# Patient Record
Sex: MALE | Race: ASIAN | Employment: FULL TIME | URBAN - METROPOLITAN AREA
[De-identification: names, ages, dates, MRNs, and addresses within clinical notes are randomized per-mention and may not be internally consistent; named-entity substitution may affect disease eponyms.]

---

## 2018-12-19 ENCOUNTER — TELEPHONE (OUTPATIENT)
Dept: OTHER | Facility: CLINIC | Age: 23
End: 2018-12-19

## 2018-12-19 NOTE — TELEPHONE ENCOUNTER
12/19/2018    Call Regarding Onboarding P1 Other    Attempt 1    Message on voicemail     Comments:       Outreach   CWR

## 2022-01-07 ENCOUNTER — OFFICE VISIT (OUTPATIENT)
Dept: FAMILY MEDICINE | Facility: CLINIC | Age: 27
End: 2022-01-07
Payer: COMMERCIAL

## 2022-01-07 VITALS
BODY MASS INDEX: 35.77 KG/M2 | WEIGHT: 236 LBS | HEIGHT: 68 IN | DIASTOLIC BLOOD PRESSURE: 78 MMHG | RESPIRATION RATE: 18 BRPM | HEART RATE: 86 BPM | TEMPERATURE: 98.2 F | SYSTOLIC BLOOD PRESSURE: 112 MMHG | OXYGEN SATURATION: 98 %

## 2022-01-07 DIAGNOSIS — R21 RASH AND NONSPECIFIC SKIN ERUPTION: ICD-10-CM

## 2022-01-07 DIAGNOSIS — Z11.59 NEED FOR HEPATITIS C SCREENING TEST: ICD-10-CM

## 2022-01-07 DIAGNOSIS — Z13.1 SCREENING FOR DIABETES MELLITUS: ICD-10-CM

## 2022-01-07 DIAGNOSIS — E66.09 CLASS 2 OBESITY DUE TO EXCESS CALORIES WITHOUT SERIOUS COMORBIDITY WITH BODY MASS INDEX (BMI) OF 35.0 TO 35.9 IN ADULT: ICD-10-CM

## 2022-01-07 DIAGNOSIS — Z11.4 SCREENING FOR HIV (HUMAN IMMUNODEFICIENCY VIRUS): ICD-10-CM

## 2022-01-07 DIAGNOSIS — Z83.49 FAMILY HISTORY OF THYROID DISEASE: ICD-10-CM

## 2022-01-07 DIAGNOSIS — E66.812 CLASS 2 OBESITY DUE TO EXCESS CALORIES WITHOUT SERIOUS COMORBIDITY WITH BODY MASS INDEX (BMI) OF 35.0 TO 35.9 IN ADULT: ICD-10-CM

## 2022-01-07 DIAGNOSIS — Z13.220 LIPID SCREENING: ICD-10-CM

## 2022-01-07 DIAGNOSIS — Z00.00 ROUTINE GENERAL MEDICAL EXAMINATION AT A HEALTH CARE FACILITY: Primary | ICD-10-CM

## 2022-01-07 LAB
BASOPHILS # BLD AUTO: 0 10E3/UL (ref 0–0.2)
BASOPHILS NFR BLD AUTO: 0 %
EOSINOPHIL # BLD AUTO: 0.2 10E3/UL (ref 0–0.7)
EOSINOPHIL NFR BLD AUTO: 2 %
ERYTHROCYTE [DISTWIDTH] IN BLOOD BY AUTOMATED COUNT: 12.3 % (ref 10–15)
HBA1C MFR BLD: 5.8 % (ref 0–5.6)
HCT VFR BLD AUTO: 45.6 % (ref 40–53)
HGB BLD-MCNC: 15.2 G/DL (ref 13.3–17.7)
LYMPHOCYTES # BLD AUTO: 2.6 10E3/UL (ref 0.8–5.3)
LYMPHOCYTES NFR BLD AUTO: 38 %
MCH RBC QN AUTO: 27.8 PG (ref 26.5–33)
MCHC RBC AUTO-ENTMCNC: 33.3 G/DL (ref 31.5–36.5)
MCV RBC AUTO: 84 FL (ref 78–100)
MONOCYTES # BLD AUTO: 0.6 10E3/UL (ref 0–1.3)
MONOCYTES NFR BLD AUTO: 9 %
NEUTROPHILS # BLD AUTO: 3.4 10E3/UL (ref 1.6–8.3)
NEUTROPHILS NFR BLD AUTO: 51 %
PLATELET # BLD AUTO: 350 10E3/UL (ref 150–450)
RBC # BLD AUTO: 5.46 10E6/UL (ref 4.4–5.9)
WBC # BLD AUTO: 6.8 10E3/UL (ref 4–11)

## 2022-01-07 PROCEDURE — 36415 COLL VENOUS BLD VENIPUNCTURE: CPT | Performed by: FAMILY MEDICINE

## 2022-01-07 PROCEDURE — 80061 LIPID PANEL: CPT | Performed by: FAMILY MEDICINE

## 2022-01-07 PROCEDURE — 99385 PREV VISIT NEW AGE 18-39: CPT | Performed by: FAMILY MEDICINE

## 2022-01-07 PROCEDURE — 86803 HEPATITIS C AB TEST: CPT | Performed by: FAMILY MEDICINE

## 2022-01-07 PROCEDURE — 83036 HEMOGLOBIN GLYCOSYLATED A1C: CPT | Performed by: FAMILY MEDICINE

## 2022-01-07 PROCEDURE — 80050 GENERAL HEALTH PANEL: CPT | Performed by: FAMILY MEDICINE

## 2022-01-07 PROCEDURE — 87389 HIV-1 AG W/HIV-1&-2 AB AG IA: CPT | Performed by: FAMILY MEDICINE

## 2022-01-07 SDOH — ECONOMIC STABILITY: TRANSPORTATION INSECURITY
IN THE PAST 12 MONTHS, HAS THE LACK OF TRANSPORTATION KEPT YOU FROM MEDICAL APPOINTMENTS OR FROM GETTING MEDICATIONS?: NO

## 2022-01-07 SDOH — ECONOMIC STABILITY: INCOME INSECURITY: HOW HARD IS IT FOR YOU TO PAY FOR THE VERY BASICS LIKE FOOD, HOUSING, MEDICAL CARE, AND HEATING?: NOT HARD AT ALL

## 2022-01-07 SDOH — ECONOMIC STABILITY: TRANSPORTATION INSECURITY
IN THE PAST 12 MONTHS, HAS LACK OF TRANSPORTATION KEPT YOU FROM MEETINGS, WORK, OR FROM GETTING THINGS NEEDED FOR DAILY LIVING?: NO

## 2022-01-07 SDOH — ECONOMIC STABILITY: FOOD INSECURITY: WITHIN THE PAST 12 MONTHS, THE FOOD YOU BOUGHT JUST DIDN'T LAST AND YOU DIDN'T HAVE MONEY TO GET MORE.: NEVER TRUE

## 2022-01-07 SDOH — ECONOMIC STABILITY: INCOME INSECURITY: IN THE LAST 12 MONTHS, WAS THERE A TIME WHEN YOU WERE NOT ABLE TO PAY THE MORTGAGE OR RENT ON TIME?: NO

## 2022-01-07 SDOH — ECONOMIC STABILITY: FOOD INSECURITY: WITHIN THE PAST 12 MONTHS, YOU WORRIED THAT YOUR FOOD WOULD RUN OUT BEFORE YOU GOT MONEY TO BUY MORE.: NEVER TRUE

## 2022-01-07 SDOH — HEALTH STABILITY: PHYSICAL HEALTH: ON AVERAGE, HOW MANY DAYS PER WEEK DO YOU ENGAGE IN MODERATE TO STRENUOUS EXERCISE (LIKE A BRISK WALK)?: 3 DAYS

## 2022-01-07 SDOH — HEALTH STABILITY: PHYSICAL HEALTH: ON AVERAGE, HOW MANY MINUTES DO YOU ENGAGE IN EXERCISE AT THIS LEVEL?: 30 MIN

## 2022-01-07 ASSESSMENT — ENCOUNTER SYMPTOMS
DYSURIA: 0
HEARTBURN: 0
ARTHRALGIAS: 0
COUGH: 0
CHILLS: 0
CONSTIPATION: 0
NAUSEA: 0
SHORTNESS OF BREATH: 0
MYALGIAS: 0
FREQUENCY: 0
EYE PAIN: 0
PALPITATIONS: 0
JOINT SWELLING: 0
DIZZINESS: 0
WEAKNESS: 0
DIARRHEA: 0
SORE THROAT: 0
NERVOUS/ANXIOUS: 0
HEMATOCHEZIA: 0
HEMATURIA: 0
PARESTHESIAS: 1
ABDOMINAL PAIN: 0
HEADACHES: 0
FEVER: 0

## 2022-01-07 ASSESSMENT — SOCIAL DETERMINANTS OF HEALTH (SDOH)
IN A TYPICAL WEEK, HOW MANY TIMES DO YOU TALK ON THE PHONE WITH FAMILY, FRIENDS, OR NEIGHBORS?: MORE THAN THREE TIMES A WEEK
DO YOU BELONG TO ANY CLUBS OR ORGANIZATIONS SUCH AS CHURCH GROUPS UNIONS, FRATERNAL OR ATHLETIC GROUPS, OR SCHOOL GROUPS?: NO
HOW OFTEN DO YOU GET TOGETHER WITH FRIENDS OR RELATIVES?: NEVER
HOW OFTEN DO YOU ATTEND CHURCH OR RELIGIOUS SERVICES?: NEVER

## 2022-01-07 ASSESSMENT — LIFESTYLE VARIABLES
HOW OFTEN DO YOU HAVE SIX OR MORE DRINKS ON ONE OCCASION: NEVER
HOW OFTEN DO YOU HAVE A DRINK CONTAINING ALCOHOL: NEVER
HOW MANY STANDARD DRINKS CONTAINING ALCOHOL DO YOU HAVE ON A TYPICAL DAY: PATIENT DECLINED

## 2022-01-07 ASSESSMENT — MIFFLIN-ST. JEOR: SCORE: 2024.99

## 2022-01-07 NOTE — PATIENT INSTRUCTIONS
Zyrtec 10 mg daily 2 weeks    Preventive Health Recommendations  Male Ages 26 - 39    Yearly exam:             See your health care provider every year in order to  o   Review health changes.   o   Discuss preventive care.    o   Review your medicines if your doctor has prescribed any.    You should be tested each year for STDs (sexually transmitted diseases), if you re at risk.     After age 35, talk to your provider about cholesterol testing. If you are at risk for heart disease, have your cholesterol tested at least every 5 years.     If you are at risk for diabetes, you should have a diabetes test (fasting glucose).  Shots: Get a flu shot each year. Get a tetanus shot every 10 years.     Nutrition:    Eat at least 5 servings of fruits and vegetables daily.     Eat whole-grain bread, whole-wheat pasta and brown rice instead of white grains and rice.     Get adequate Calcium and Vitamin D.     Lifestyle    Exercise for at least 150 minutes a week (30 minutes a day, 5 days a week). This will help you control your weight and prevent disease.     Limit alcohol to one drink per day.     No smoking.     Wear sunscreen to prevent skin cancer.     See your dentist every six months for an exam and cleaning.     Patient Education     Hives (Adult)  Hives are pink or red bumps on the skin. These bumps are also known as wheals. The bumps can itch, burn, or sting. Hives can occur anywhere on the body. They vary in size and shape and can form in clusters. Individual hives can appear and go away quickly. New hives may develop as old ones fade. Hives are common and usually harmless. They are not contagious. Occasionally, hives are a sign of a serious allergy.   Hives are often caused by an allergic reaction. They may occur from:     Certain foods, such as shellfish, nuts, tomatoes, or berries    Contact with something in the environment, such as pollens, animals, or mold    Certain medicines    Sun or cold air    Viral  infections, such as a cold, the flu, or strep throat  If the hives continue to come and go over many weeks without any other symptoms (chronic hives), the cause may be very hard to figure out.   You may be prescribed medicines to ease swelling and itching. Follow all instructions when using these medicines. The hives will usually fade in a few days. But they can last for weeks or months.   Home care   Follow these tips:    Try to find the cause of the hives and eliminate it. Discuss possible causes with your healthcare provider. Your healthcare provider may ask you to keep track of the food you eat and your lifestyle to help find the cause of the hives.    Don t scratch the hives. Scratching will delay healing. To reduce itching, apply cool, wet compresses to the skin.    Dress in soft, loose cotton clothing.    Don t bathe in hot water. This can make the itching worse.    Apply an ice pack or cool pack wrapped in a thin towel to your skin. This will help reduce redness and itching. But if your hives were caused by exposure to cold, then do not apply more cold to them.    You may use over-the counter antihistamines to reduce itching. Some older antihistamines, such as diphenhydramine and chlorpheniramine, are inexpensive. But they need to be taken often and may make you sleepy. They are best used at bedtime. Don t use diphenhydramine if you have glaucoma or have trouble urinating because of an enlarged prostate. Newer antihistamines, such as loratadine, cetirizine, levocetirizine, and fexofenadine, are generally more expensive. But they tend to have fewer side effects. They can be taken less often.    Another type of antihistamine is used to treat heartburn. This type includes nizatidine, famotidine, and cimetidine. These are sometimes used along with the above antihistamines if a single medicine is not working.    If the hives are severe and you do not respond well to other medicines, you may be given a steroid,  such as prednisone, to take for a short time. Follow all instructions carefully when taking this medicine. Tell your healthcare provider about any side effects.  Follow-up care   Follow up with your healthcare provider if your symptoms don't get better in 2 days. Ask your provider about allergy testing if you have had a severe reaction or have had several episodes of hives. Allergy testing may help figure out what you are allergic to. You may need blood tests, a urine test, or skin tests.   When to seek medical advice   Call your healthcare provider right away if any of these occur:     Fever of 100.4 F (38.0 C) or higher, or as directed by your healthcare provider    Redness, swelling, or pain    Foul-smelling fluid coming from the rash  Call 911  Call 911 if any of the following occur:     Swelling of the face, throat, or tongue    Trouble breathing or swallowing    Dizziness, weakness, or fainting  [employer] last reviewed this educational content on 6/1/2019 2000-2021 [employer]. All rights reserved. This information is not intended as a substitute for professional medical care. Always follow your healthcare professional's instructions.

## 2022-01-07 NOTE — PROGRESS NOTES
SUBJECTIVE:   CC: Tony Blue is an 26 year old male who presents for preventative health visit.     Patient has been advised of split billing requirements and indicates understanding: Yes  Healthy Habits:     Getting at least 3 servings of Calcium per day:  Yes    Bi-annual eye exam:  NO    Dental care twice a year:  NO    Sleep apnea or symptoms of sleep apnea:  Excessive snoring    Diet:  Regular (no restrictions)    Frequency of exercise:  2-3 days/week    Duration of exercise:  15-30 minutes    Taking medications regularly:  Not Applicable    Medication side effects:  None    PHQ-2 Total Score: 0    Additional concerns today:  No    Today's PHQ-2 Score:   PHQ-2 ( 1999 [employer]) 1/7/2022   Q1: Little interest or pleasure in doing things 0   Q2: Feeling down, depressed or hopeless 0   PHQ-2 Score 0   Q1: Little interest or pleasure in doing things Not at all   Q2: Feeling down, depressed or hopeless Not at all   PHQ-2 Score 0     Abuse: Current or Past(Physical, Sexual or Emotional)- No  Do you feel safe in your environment? Yes    Have you ever done Advance Care Planning? (For example, a Health Directive, POLST, or a discussion with a medical provider or your loved ones about your wishes): No, advance care planning information given to patient to review.  Patient declined advance care planning discussion at this time.    Social History     Tobacco Use     Smoking status: Never Smoker     Smokeless tobacco: Never Used   Substance Use Topics     Alcohol use: Never     Alcohol Use 1/7/2022   Prescreen: >3 drinks/day or >7 drinks/week? Not Applicable     Last PSA: No results found for: PSA    Reviewed orders with patient. Reviewed health maintenance and updated orders accordingly - Yes    Reviewed and updated as needed this visit by clinical staff  Tobacco  Allergies  Meds  Problems  Med Hx  Surg Hx  Fam Hx  Soc Hx         Reviewed and updated as needed this visit by Provider  Tobacco   Meds  Problems          "  History reviewed. No pertinent past medical history.   Past Surgical History:   Procedure Laterality Date     MASS EXCISION  2021    Right 5th finger mass removal     Review of Systems   Constitutional: Negative for chills and fever.   HENT: Negative for congestion, ear pain, hearing loss and sore throat.    Eyes: Negative for pain and visual disturbance.   Respiratory: Negative for cough and shortness of breath.    Cardiovascular: Negative for chest pain, palpitations and peripheral edema.   Gastrointestinal: Negative for abdominal pain, constipation, diarrhea, heartburn, hematochezia and nausea.   Genitourinary: Negative for dysuria, frequency, genital sores, hematuria, impotence, penile discharge and urgency.   Musculoskeletal: Negative for arthralgias, joint swelling and myalgias.   Skin: Positive for rash.   Neurological: Positive for paresthesias. Negative for dizziness, weakness and headaches.   Psychiatric/Behavioral: Negative for mood changes. The patient is not nervous/anxious.      OBJECTIVE:   /78   Pulse 86   Temp 98.2  F (36.8  C) (Oral)   Resp 18   Ht 1.727 m (5' 8\")   Wt 107 kg (236 lb)   SpO2 98%   BMI 35.88 kg/m      Physical Exam  GENERAL: healthy, alert and no distress  EYES: Eyes grossly normal to inspection, PERRL and conjunctivae and sclerae normal  HENT: ear canals and TM's normal, nose and mouth without ulcers or lesions  NECK: no adenopathy, no asymmetry, masses, or scars and thyroid normal to palpation  RESP: lungs clear to auscultation - no rales, rhonchi or wheezes  CV: regular rate and rhythm, normal S1 S2, no S3 or S4, no murmur, click or rub, no peripheral edema and peripheral pulses strong  ABDOMEN: soft, nontender, no hepatosplenomegaly, no masses and bowel sounds normal  MS: no gross musculoskeletal defects noted, no edema  SKIN: no suspicious lesions or rashes  NEURO: Normal strength and tone, mentation intact and speech normal  PSYCH: mentation appears normal, " "affect normal/bright    Diagnostic Test Results:  Labs reviewed in Epic    ASSESSMENT/PLAN:       ICD-10-CM    1. Routine general medical examination at a health care facility  Z00.00    2. Screening for HIV (human immunodeficiency virus)  Z11.4 HIV Antigen Antibody Combo   3. Class 2 obesity due to excess calories without serious comorbidity with body mass index (BMI) of 35.0 to 35.9 in adult  E66.09 Comprehensive metabolic panel (BMP + Alb, Alk Phos, ALT, AST, Total. Bili, TP)    Z68.35    4. Lipid screening  Z13.220 Lipid panel reflex to direct LDL Fasting   5. Need for hepatitis C screening test  Z11.59 Hepatitis C Screen Reflex to HCV RNA Quant and Genotype   6. Family history of thyroid disease  Z83.49 TSH with free T4 reflex   7. Screening for diabetes mellitus  Z13.1 Hemoglobin A1c   8. Rash and nonspecific skin eruption  R21 CBC with platelets and differential     Patient has been advised of split billing requirements and indicates understanding: No     COUNSELING:   Reviewed preventive health counseling, as reflected in patient instructions    Estimated body mass index is 35.88 kg/m  as calculated from the following:    Height as of this encounter: 1.727 m (5' 8\").    Weight as of this encounter: 107 kg (236 lb).     Weight management plan: Discussed healthy diet and exercise guidelines    He reports that he has never smoked. He has never used smokeless tobacco.    Counseling Resources:  ATP IV Guidelines  Pooled Cohorts Equation Calculator  FRAX Risk Assessment  ICSI Preventive Guidelines  Dietary Guidelines for Americans, 2010  USDA's MyPlate  ASA Prophylaxis  Lung CA Screening    Nestor Gomes MD  [facility]  "

## 2022-01-07 NOTE — LETTER
"January 13, 2022      Tony Su  [address] [facility] [address]        Dear ,    We are writing to inform you of your test results.    The results of your recent total cholesterol test were elevated.  Your total cholesterol should be less than 200.     The primary goal of therapy is the LDL or \"bad\" cholesterol.  Your LDL level was within normal limits.  Your LDL goal based on risk factors (i.e. medical history, smoking, family history, high blood pressure, low HDL cholesterol) and age is less than 130.     Your HDL, or \"good\" cholesterol is abnormal.  Your goal is an HDL level above 40.     Triglycerides are another cholesterol component that is associated with heart disease.  Normal triglycerides are less than 150.  Your triglyceride level is abnormal.     I would recommend: increase daily fiber intake, weight loss, increase physical activity with a goal of 150 minutes moderate exercise weekly, decrease saturated fat intake to less than 7% of total calories, and repeat fasting lipids and liver tests in 6 months.  Triglyceride levels above 400 can be an independent risk factor for vascular disease but can also increase her risk for pancreatitis.  If this level remains above 400 we would have you consider medication for triglycerides.  I would have you work on diet and activity changes and recheck with fasting cholesterol check in 6 months.  Follow-up in clinic in 6 months fasting for 12 hours.  If you are not able to make changes with recommendations below then we will have you consider medication.     Your hemoglobin A1C (measures average blood sugar level) is high and considered to be \"pre-diabetes.\"  This is related to having a poor diet with too many sugary foods and carbohydrates found in foods such as pasta, bread, and potatoes.  It can also reflect low levels of activity.  I would recommend increasing your activity to 30 minutes most days of the week and improving your diet.     Your complete " "metabolic panel indicates normal kidney function, normal electrolyte levels (sodium, potassium, and calcium levels are normal), and normal liver function (ALT, AST, bilirubin).     You have normal red blood cell (hemoglobin) levels, no anemia, normal white blood cell count and normal platelet levels.     Your TSH level, a screening test for thyroid disease, was normal.     Your HIV screening was normal.     Your hepatitis C screening was normal.     What lifestyle changes can I make to help improve my cholesterol levels?     Exercise regularly.  Exercise can raise HDL cholesterol levels and reduce levels of LDL cholesterol and triglycerides. If you haven't been exercising, try to work up to 30 minutes, 4 to 6 times a week.     Lose weight if you are overweight.  Being overweight can raise your cholesterol levels. Losing weight, even just 5 or 10 pounds, can lower your total cholesterol, LDL cholesterol and triglyceride levels.   If you smoke, quit.     Smoking lowers your HDL cholesterol. Even exposure to second-hand smoke can affect your HDL level.  If you need help let us know or contact [employer] for a personalized tobacco cessation plan: www.quitplan.org or 2-753-895-PLAN.     Eat a heart-healthy diet.  Eat plenty of fresh fruits and vegetables. Fruits and vegetables are naturally low in fat. Not only do they add flavor and variety to your diet, but they are also the best source of fiber, vitamins and minerals for your body. Aim for 5 cups of fruits and vegetables every day, not counting potatoes, corn and rice. Potatoes, corn and rice count as carbohydrates.     Pick \"good\" fats over \"bad\" fats. Fat is part of a healthy diet, but you need to know the difference between \"bad\" fats and \"good\" fats. \"Bad\" fats, such as saturated and trans fats, are found in foods such as butter; coconut and palm oil; saturated or partially hydrogenated vegetable fats such as shortening and margarine; animal fats in meats; and " "fats in whole milk dairy products. Limit the amount of saturated fat in your diet, and avoid trans fat completely. Unsaturated fat is the \"good\" fat. Most fats in fish, vegetables, grains and tree nuts are unsaturated. Try to eat unsaturated fat in place of saturated fat. For example, you can use olive oil or canola oil in cooking instead of butter.     Use healthier cooking methods. Baking, broiling and roasting are the healthiest ways to prepare meat, poultry and other foods. Trim any outside fat or skin before cooking. Lean cuts can be pan-broiled or stir-fried. Use either a nonstick pan or nonstick cooking spray instead of adding fats such as butter or margarine. When eating out, ask how food is prepared. You can request that your food be baked, broiled or roasted, rather than fried.     Look for other sources of protein. Fish, dry beans, tree nuts, peas and lentils offer protein, nutrients and fiber without the cholesterol and saturated fats that meats have. Consider eating one \"meatless\" meal each week. Try substituting beans for meat in a favorite recipe, such as lasagna or chili. Snack on a handful of almonds or pecans. Soy is also an excellent source of protein. Good examples of soy include soy milk, edamame (green soy beans), tofu and soy protein shakes.     Get more fiber in your diet. Add good sources of fiber to your meals. Examples include fruits, vegetables, whole grains (such as oat bran, whole and rolled oats and barley), legumes (such as beans and peas) and nuts and seeds (such as ground flax seed). In addition to fiber, whole grains supply B-vitamins and important nutrients not found in foods made with white flour.     Eat more fish. Fish are an excellent source of omega-3 fatty acids. Wild-caught oily fish, such as salmon, tuna, mackerel and sardines, are the best sources of omega-3s, but all fish contain some amount of this beneficial fatty acid. Aim for 2 6-oz servings each week.     Limit how " much cholesterol you get in your diet. You should limit your overall cholesterol intake to less than 300 milligrams per day, or less than 200 milligrams if you have heart disease.     It was a pleasure to see you at your recent visit.  Please call if you have any questions.       Resulted Orders   Lipid panel reflex to direct LDL Fasting   Result Value Ref Range    Cholesterol 231 (H) <200 mg/dL    Triglycerides 400 (H) <150 mg/dL    Direct Measure HDL 27 (L) >=40 mg/dL    LDL Cholesterol Calculated 124 (H) <=100 mg/dL    Non HDL Cholesterol 204 (H) <130 mg/dL    Patient Fasting > 8hrs? No     Narrative    Cholesterol  Desirable:  <200 mg/dL    Triglycerides  Normal:  Less than 150 mg/dL  Borderline High:  150-199 mg/dL  High:  200-499 mg/dL  Very High:  Greater than or equal to 500 mg/dL    Direct Measure HDL  Female:  Greater than or equal to 50 mg/dL   Male:  Greater than or equal to 40 mg/dL    LDL Cholesterol  Desirable:  <100mg/dL  Above Desirable:  100-129 mg/dL   Borderline High:  130-159 mg/dL   High:  160-189 mg/dL   Very High:  >= 190 mg/dL    Non HDL Cholesterol  Desirable:  130 mg/dL  Above Desirable:  130-159 mg/dL  Borderline High:  160-189 mg/dL  High:  190-219 mg/dL  Very High:  Greater than or equal to 220 mg/dL   Comprehensive metabolic panel (BMP + Alb, Alk Phos, ALT, AST, Total. Bili, TP)   Result Value Ref Range    Sodium 135 133 - 144 mmol/L    Potassium 4.3 3.4 - 5.3 mmol/L    Chloride 106 94 - 109 mmol/L    Carbon Dioxide (CO2) 25 20 - 32 mmol/L    Anion Gap 4 3 - 14 mmol/L    Urea Nitrogen 15 7 - 30 mg/dL    Creatinine 0.91 0.66 - 1.25 mg/dL    Calcium 8.6 8.5 - 10.1 mg/dL    Glucose 100 (H) 70 - 99 mg/dL    Alkaline Phosphatase 78 40 - 150 U/L    AST 26 0 - 45 U/L    ALT 68 0 - 70 U/L    Protein Total 7.7 6.8 - 8.8 g/dL    Albumin 4.2 3.4 - 5.0 g/dL    Bilirubin Total 0.4 0.2 - 1.3 mg/dL    GFR Estimate >90 >60 mL/min/1.73m2      Comment:      Effective December 21, 2021 eGFRcr in adults  is calculated using the 2021 CKD-EPI creatinine equation which includes age and gender (Raphael et al., NE, DOI: 10.1056/JJLXzh0336431)   HIV Antigen Antibody Combo   Result Value Ref Range    HIV Antigen Antibody Combo Nonreactive Nonreactive      Comment:      HIV-1 p24 Ag & HIV-1/HIV-2 Ab Not Detected   Hepatitis C Screen Reflex to HCV RNA Quant and Genotype   Result Value Ref Range    Hepatitis C Antibody Nonreactive Nonreactive    Narrative    Assay performance characteristics have not been established for newborns, infants, and children.   TSH with free T4 reflex   Result Value Ref Range    TSH 2.36 0.40 - 4.00 mU/L   Hemoglobin A1c   Result Value Ref Range    Hemoglobin A1C 5.8 (H) 0.0 - 5.6 %      Comment:      Normal <5.7%   Prediabetes 5.7-6.4%    Diabetes 6.5% or higher     Note: Adopted from ADA consensus guidelines.   CBC with platelets and differential   Result Value Ref Range    WBC Count 6.8 4.0 - 11.0 10e3/uL    RBC Count 5.46 4.40 - 5.90 10e6/uL    Hemoglobin 15.2 13.3 - 17.7 g/dL    Hematocrit 45.6 40.0 - 53.0 %    MCV 84 78 - 100 fL    MCH 27.8 26.5 - 33.0 pg    MCHC 33.3 31.5 - 36.5 g/dL    RDW 12.3 10.0 - 15.0 %    Platelet Count 350 150 - 450 10e3/uL    % Neutrophils 51 %    % Lymphocytes 38 %    % Monocytes 9 %    % Eosinophils 2 %    % Basophils 0 %    Absolute Neutrophils 3.4 1.6 - 8.3 10e3/uL    Absolute Lymphocytes 2.6 0.8 - 5.3 10e3/uL    Absolute Monocytes 0.6 0.0 - 1.3 10e3/uL    Absolute Eosinophils 0.2 0.0 - 0.7 10e3/uL    Absolute Basophils 0.0 0.0 - 0.2 10e3/uL       If you have any questions or concerns, please call the clinic at the number listed above.       Sincerely,      Nestor Gomes MD

## 2022-01-08 LAB
ALBUMIN SERPL-MCNC: 4.2 G/DL (ref 3.4–5)
ALP SERPL-CCNC: 78 U/L (ref 40–150)
ALT SERPL W P-5'-P-CCNC: 68 U/L (ref 0–70)
ANION GAP SERPL CALCULATED.3IONS-SCNC: 4 MMOL/L (ref 3–14)
AST SERPL W P-5'-P-CCNC: 26 U/L (ref 0–45)
BILIRUB SERPL-MCNC: 0.4 MG/DL (ref 0.2–1.3)
BUN SERPL-MCNC: 15 MG/DL (ref 7–30)
CALCIUM SERPL-MCNC: 8.6 MG/DL (ref 8.5–10.1)
CHLORIDE BLD-SCNC: 106 MMOL/L (ref 94–109)
CHOLEST SERPL-MCNC: 231 MG/DL
CO2 SERPL-SCNC: 25 MMOL/L (ref 20–32)
CREAT SERPL-MCNC: 0.91 MG/DL (ref 0.66–1.25)
FASTING STATUS PATIENT QL REPORTED: NO
GFR SERPL CREATININE-BSD FRML MDRD: >90 ML/MIN/1.73M2
GLUCOSE BLD-MCNC: 100 MG/DL (ref 70–99)
HDLC SERPL-MCNC: 27 MG/DL
LDLC SERPL CALC-MCNC: 124 MG/DL
NONHDLC SERPL-MCNC: 204 MG/DL
POTASSIUM BLD-SCNC: 4.3 MMOL/L (ref 3.4–5.3)
PROT SERPL-MCNC: 7.7 G/DL (ref 6.8–8.8)
SODIUM SERPL-SCNC: 135 MMOL/L (ref 133–144)
TRIGL SERPL-MCNC: 400 MG/DL
TSH SERPL DL<=0.005 MIU/L-ACNC: 2.36 MU/L (ref 0.4–4)

## 2022-01-10 LAB
HCV AB SERPL QL IA: NONREACTIVE
HIV 1+2 AB+HIV1 P24 AG SERPL QL IA: NONREACTIVE

## 2022-01-13 PROBLEM — R73.03 PREDIABETES: Status: ACTIVE | Noted: 2022-01-13

## 2022-01-13 PROBLEM — E78.1 HYPERTRIGLYCERIDEMIA: Status: ACTIVE | Noted: 2022-01-13

## 2022-07-18 ENCOUNTER — TELEPHONE (OUTPATIENT)
Dept: FAMILY MEDICINE | Facility: CLINIC | Age: 27
End: 2022-07-18

## 2022-07-18 NOTE — TELEPHONE ENCOUNTER
.Reason for Call: Request for an order or referral:    Order or referral being requested: Dermatology     Date needed: at your convenience    Has the patient been seen by the PCP for this problem? YES    Additional comments: Hives are still occurring even with OTC medication and creams. Heat makes it worse. Wife called asking for derm referral     Phone number Patient can be reached at:  Cell number on file:    Telephone Information:   Mobile [phone]       Best Time:  anytime    Can we leave a detailed message on this number?  YES    Call taken on 7/18/2022 at 9:32 AM by Ray Stanley

## 2022-07-18 NOTE — TELEPHONE ENCOUNTER
Pt requesting referral for Derm. Pt was seen by ANNEQ with OV on 1/7/22, states that medications have not helped with rash and would like to be seen by dermatology. Pt is a new pt to [facility] and has not est care with new provider. Called and spoke with pt, advised to schedule follow up to discuss and est care, verbalized understanding. Scheduled pt for 8/9/22.    Andrade STRONG RN

## 2022-08-09 ENCOUNTER — OFFICE VISIT (OUTPATIENT)
Dept: FAMILY MEDICINE | Facility: CLINIC | Age: 27
End: 2022-08-09
Payer: COMMERCIAL

## 2022-08-09 VITALS
DIASTOLIC BLOOD PRESSURE: 68 MMHG | SYSTOLIC BLOOD PRESSURE: 112 MMHG | RESPIRATION RATE: 16 BRPM | HEIGHT: 68 IN | OXYGEN SATURATION: 100 % | WEIGHT: 240 LBS | HEART RATE: 78 BPM | TEMPERATURE: 98.4 F | BODY MASS INDEX: 36.37 KG/M2

## 2022-08-09 DIAGNOSIS — R21 RASH AND NONSPECIFIC SKIN ERUPTION: Primary | ICD-10-CM

## 2022-08-09 DIAGNOSIS — E78.2 MIXED HYPERLIPIDEMIA: ICD-10-CM

## 2022-08-09 DIAGNOSIS — E66.01 MORBID OBESITY (H): ICD-10-CM

## 2022-08-09 PROCEDURE — 99213 OFFICE O/P EST LOW 20 MIN: CPT | Performed by: FAMILY MEDICINE

## 2022-08-09 RX ORDER — MONTELUKAST SODIUM 10 MG/1
10 TABLET ORAL AT BEDTIME
Qty: 14 TABLET | Refills: 0 | Status: SHIPPED | OUTPATIENT
Start: 2022-08-09 | End: 2024-02-07

## 2022-08-09 ASSESSMENT — ENCOUNTER SYMPTOMS
SHORTNESS OF BREATH: 0
TROUBLE SWALLOWING: 0

## 2022-08-09 NOTE — PROGRESS NOTES
Assessment & Plan     Rash and nonspecific skin eruption  Patient having diffuse and confluent maculopapular rash with exertion and heat exposure, has only had limited improvement to Zyrtec which he is trialed on 10 to 20 mg dose.  No suspicion for angioedema, no obvious other exposures.  At this time I would like him to try Singulair and have him seen by the dermatologist for additional suggestions.  - montelukast (SINGULAIR) 10 MG tablet  Dispense: 14 tablet; Refill: 0  - Adult Dermatology Referral    Mixed hyperlipidemia  Comorbid prediabetes, discussed medication and lifestyle changes, patient declined meds at this time.    Morbid obesity (H)        Return in about 1 year (around 8/9/2023) for Annual Preventative Visit..    Gonzalez Bentley MD  [facility] IGGY Jimenez is a 27 year old, presenting for the following health issues:  Derm Problem      History of Present Illness       Reason for visit:  Skin rashes    He eats 2-3 servings of fruits and vegetables daily.He consumes 0 sweetened beverage(s) daily.He exercises with enough effort to increase his heart rate 9 or less minutes per day.  He exercises with enough effort to increase his heart rate 3 or less days per week.   He is taking medications regularly.     Presents with concerns of skin rashes for the last 6 months, every time he exercises and has heat exposure, breaks out in itchy red raised rashes.  Has been using Zyrtec sometimes up to 20 mg, and improve the itchiness and flattens the rashes however they remain red.  It improves after stopping exercise.  Has been trying to lose weight.    Moved from [facility], no other exposures or new pets.  No change of detergents.    Currently no rash.  Review of Systems   HENT: Negative for trouble swallowing.    Respiratory: Negative for shortness of breath.    Skin: Positive for rash.            Objective    /68   Pulse 78   Temp 98.4  F (36.9  C) (Oral)   Resp 16   Ht  "1.727 m (5' 8\")   Wt 108.9 kg (240 lb)   SpO2 100%   BMI 36.49 kg/m    Body mass index is 36.49 kg/m .  Physical Exam  Constitutional:       Appearance: He is not ill-appearing.      Comments: Cell phone video, erythematous papules throughout the trunk and extremities   Pulmonary:      Effort: Pulmonary effort is normal.          CBC RESULTS: Recent Labs   Lab Test 01/07/22  1611   WBC 6.8   RBC 5.46   HGB 15.2   HCT 45.6   MCV 84   MCH 27.8   MCHC 33.3   RDW 12.3        TSH   Date Value Ref Range Status   01/07/2022 2.36 0.40 - 4.00 mU/L Final                     .  ..  "

## 2022-08-26 ENCOUNTER — VIRTUAL VISIT (OUTPATIENT)
Dept: DERMATOLOGY | Facility: CLINIC | Age: 27
End: 2022-08-26
Payer: COMMERCIAL

## 2022-08-26 DIAGNOSIS — L50.8 CHRONIC URTICARIA: Primary | ICD-10-CM

## 2022-08-26 PROCEDURE — 99213 OFFICE O/P EST LOW 20 MIN: CPT | Mod: 95 | Performed by: DERMATOLOGY

## 2022-08-26 NOTE — PROGRESS NOTES
[facility] Dermatology Note  Encounter Date: Aug 26, 2022  Store-and-Forward and Video ([employer]). Location of teledermatologist: [facility]MATOLOG[facility]IN[facility] [place]. Date of images: 08/26/22. Start time: 7:56. End time: 8:28.    Dermatology Problem List:  1. Chronic urticaria: fexofenadine 180-360 mg BID  - prior: cetirizine 10-20 mg daily, montelukast  - labs pending    ____________________________________________    Assessment & Plan:     1. Chronic urticaria: typical symptomatology, now >6-7 months. Will check labs and we discussed antihistamine uptitration in some detail. No angioedema. We briefly discussed treatment of refractory symptoms with omalizumab.  - check TSH, CBC, CMP, ROSEMARY  - start fexofenadine 180-360 mg BID  - stop montelukast    Procedures Performed:    None    Follow-up: 6-8 weeks    Staff:     Donald Richards MD, FAAD   of Dermatology  Department of Dermatology  [facility]    ____________________________________________    CC: Consult (New patient for rash, ongoing a couple of month )    HPI:  Mr. Tony Blue is a(n) 27 year old male who presents today as a new patient for rash    Rash - over last 6-7 months - whenever exercise or goes outside  - arms, stomach, chest, back  - no clear trigger other than warmth  - very itchy  - last ~1 hour  - appear 10-15 min after heat exposure - even walking outside ~10 min ~75-76F  - occasionally at night in bed - if covered too tightly in blanket - but inconsistently  - no trigger with hot showers  - no oral or respiratory symptoms  - no periorbital or genital edema  - tried cetirizine 10-20 mg daily - was helpful with less itching but not gone  - started montelukast per PCP - felt like developed rashes more quickly  - symptoms started in new house - usually works in colder area    Patient is otherwise feeling well, without additional skin concerns.    Labs Reviewed:  1/2022  CBC, CMP unremarkable    Physical Exam:  Vitals: There were no vitals taken for this visit.  SKIN: Teledermatology photos were reviewed; image quality and interpretability: acceptable. Image date: 8/23/22.  - numerous urticarial papules and plaques on the torso  - No other lesions of concern on areas examined.     Medications:  Current Outpatient Medications   Medication     montelukast (SINGULAIR) 10 MG tablet     No current facility-administered medications for this visit.      Past Medical/Surgical History:   Patient Active Problem List   Diagnosis     Prediabetes     Hypertriglyceridemia     Morbid obesity (H)     No past medical history on file.    CC Gonzalez Bentley MD  [address] [place] [address] on close of this encounter.

## 2022-08-26 NOTE — LETTER
8/26/2022       RE: Tony Blue  [address] [address] [address]     Dear Colleague,    Thank you for referring your patient, Tony Blue, to the [facility] DERMATOLOGY [facility]INIC [place] at [facility]. Please see a copy of my visit note below.    [facility] Dermatology Note  Encounter Date: Aug 26, 2022  Store-and-Forward and Video ([employer]). Location of teledermatologist: [facility] DERMATOLOGY [facility]. Date of images: 08/26/22. Start time: 7:56. End time: 8:28.    Dermatology Problem List:  1. Chronic urticaria: fexofenadine 180-360 mg BID  - prior: cetirizine 10-20 mg daily, montelukast  - labs pending    ____________________________________________    Assessment & Plan:     1. Chronic urticaria: typical symptomatology, now >6-7 months. Will check labs and we discussed antihistamine uptitration in some detail. No angioedema. We briefly discussed treatment of refractory symptoms with omalizumab.  - check TSH, CBC, CMP, ROSEMARY  - start fexofenadine 180-360 mg BID  - stop montelukast    Procedures Performed:    None    Follow-up: 6-8 weeks    Staff:     Donald Richards MD, FAAD   of Dermatology  Department of Dermatology  [facility]    ____________________________________________    CC: Consult (New patient for rash, ongoing a couple of month )    HPI:  Mr. Tony Blue is a(n) 27 year old male who presents today as a new patient for rash    Rash - over last 6-7 months - whenever exercise or goes outside  - arms, stomach, chest, back  - no clear trigger other than warmth  - very itchy  - last ~1 hour  - appear 10-15 min after heat exposure - even walking outside ~10 min ~75-76F  - occasionally at night in bed - if covered too tightly in blanket - but inconsistently  - no trigger with hot showers  - no oral or respiratory symptoms  - no periorbital or genital edema  - tried cetirizine  10-20 mg daily - was helpful with less itching but not gone  - started montelukast per PCP - felt like developed rashes more quickly  - symptoms started in new house - usually works in colder area    Patient is otherwise feeling well, without additional skin concerns.    Labs Reviewed:  1/2022 CBC, CMP unremarkable    Physical Exam:  Vitals: There were no vitals taken for this visit.  SKIN: Teledermatology photos were reviewed; image quality and interpretability: acceptable. Image date: 8/23/22.  - numerous urticarial papules and plaques on the torso  - No other lesions of concern on areas examined.     Medications:  Current Outpatient Medications   Medication     montelukast (SINGULAIR) 10 MG tablet     No current facility-administered medications for this visit.      Past Medical/Surgical History:   Patient Active Problem List   Diagnosis     Prediabetes     Hypertriglyceridemia     Morbid obesity (H)     No past medical history on file.    CC Gonzalez Bentley MD  [address] [place] [address] on close of this encounter.

## 2022-08-26 NOTE — NURSING NOTE
Chief Complaint   Patient presents with     Consult     New patient for rash, ongoing a couple of month      Teledermatology Nurse Call Patients:     Are you in the [facility] at the time of the encounter? yes    Today's visit will be billed to you and your insurance.    A teledermatology visit is not as thorough as an in-person visit and the quality of the photograph sent may not be of the same quality as that taken by the dermatology clinic.    Allergies and medications reviewed with patient.     JH Bearden

## 2022-09-07 ENCOUNTER — LAB (OUTPATIENT)
Dept: LAB | Facility: CLINIC | Age: 27
End: 2022-09-07
Payer: COMMERCIAL

## 2022-09-07 DIAGNOSIS — L50.8 CHRONIC URTICARIA: ICD-10-CM

## 2022-09-07 LAB
BASOPHILS # BLD AUTO: 0 10E3/UL (ref 0–0.2)
BASOPHILS NFR BLD AUTO: 1 %
EOSINOPHIL # BLD AUTO: 0.2 10E3/UL (ref 0–0.7)
EOSINOPHIL NFR BLD AUTO: 3 %
ERYTHROCYTE [DISTWIDTH] IN BLOOD BY AUTOMATED COUNT: 12.2 % (ref 10–15)
HCT VFR BLD AUTO: 47.3 % (ref 40–53)
HGB BLD-MCNC: 16.4 G/DL (ref 13.3–17.7)
LYMPHOCYTES # BLD AUTO: 2.6 10E3/UL (ref 0.8–5.3)
LYMPHOCYTES NFR BLD AUTO: 42 %
MCH RBC QN AUTO: 28.7 PG (ref 26.5–33)
MCHC RBC AUTO-ENTMCNC: 34.7 G/DL (ref 31.5–36.5)
MCV RBC AUTO: 83 FL (ref 78–100)
MONOCYTES # BLD AUTO: 0.8 10E3/UL (ref 0–1.3)
MONOCYTES NFR BLD AUTO: 14 %
NEUTROPHILS # BLD AUTO: 2.5 10E3/UL (ref 1.6–8.3)
NEUTROPHILS NFR BLD AUTO: 41 %
PLATELET # BLD AUTO: 362 10E3/UL (ref 150–450)
RBC # BLD AUTO: 5.71 10E6/UL (ref 4.4–5.9)
WBC # BLD AUTO: 6.1 10E3/UL (ref 4–11)

## 2022-09-07 PROCEDURE — 80050 GENERAL HEALTH PANEL: CPT

## 2022-09-07 PROCEDURE — 86038 ANTINUCLEAR ANTIBODIES: CPT

## 2022-09-07 PROCEDURE — 82728 ASSAY OF FERRITIN: CPT

## 2022-09-07 PROCEDURE — 36415 COLL VENOUS BLD VENIPUNCTURE: CPT

## 2022-09-09 LAB
ALBUMIN SERPL-MCNC: 4.3 G/DL (ref 3.4–5)
ALP SERPL-CCNC: 66 U/L (ref 40–150)
ALT SERPL W P-5'-P-CCNC: 72 U/L (ref 0–70)
ANA SER QL IF: NEGATIVE
ANION GAP SERPL CALCULATED.3IONS-SCNC: 8 MMOL/L (ref 3–14)
AST SERPL W P-5'-P-CCNC: 29 U/L (ref 0–45)
BILIRUB SERPL-MCNC: 0.5 MG/DL (ref 0.2–1.3)
BUN SERPL-MCNC: 18 MG/DL (ref 7–30)
CALCIUM SERPL-MCNC: 9.3 MG/DL (ref 8.5–10.1)
CHLORIDE BLD-SCNC: 104 MMOL/L (ref 94–109)
CO2 SERPL-SCNC: 26 MMOL/L (ref 20–32)
CREAT SERPL-MCNC: 0.93 MG/DL (ref 0.66–1.25)
FERRITIN SERPL-MCNC: 120 NG/ML (ref 26–388)
GFR SERPL CREATININE-BSD FRML MDRD: >90 ML/MIN/1.73M2
GLUCOSE BLD-MCNC: 74 MG/DL (ref 70–99)
POTASSIUM BLD-SCNC: 4.3 MMOL/L (ref 3.4–5.3)
PROT SERPL-MCNC: 7.9 G/DL (ref 6.8–8.8)
SODIUM SERPL-SCNC: 138 MMOL/L (ref 133–144)
TSH SERPL DL<=0.005 MIU/L-ACNC: 1.99 MU/L (ref 0.4–4)

## 2022-11-14 ENCOUNTER — TELEPHONE (OUTPATIENT)
Dept: DERMATOLOGY | Facility: CLINIC | Age: 27
End: 2022-11-14

## 2022-11-14 ENCOUNTER — VIRTUAL VISIT (OUTPATIENT)
Dept: DERMATOLOGY | Facility: CLINIC | Age: 27
End: 2022-11-14
Payer: COMMERCIAL

## 2022-11-14 DIAGNOSIS — L50.1 CHRONIC IDIOPATHIC URTICARIA: Primary | ICD-10-CM

## 2022-11-14 PROCEDURE — 99213 OFFICE O/P EST LOW 20 MIN: CPT | Mod: 95 | Performed by: DERMATOLOGY

## 2022-11-14 NOTE — LETTER
11/14/2022       RE: Tony Blue  [address] [address] [address]     Dear Colleague,    Thank you for referring your patient, Tony Blue, to the [facility] DERMATOLOGY CLINIC [place] at [facility]. Please see a copy of my visit note below.    [facility] Dermatology Note  Encounter Date: Nov 14, 2022  Video (invitation sent by Gosia). Location of teledermatologist: [facility]MATOLOG[facility] CLINIC [place]. Start time: 12:08. End time: 12:24.    Dermatology Problem List:  1. Chronic urticaria:   - current: fexofenadine 360 mg BID  - prior: cetirizine 10-20 mg daily, montelukast  - labs unremarkable    ____________________________________________    Assessment & Plan:     1. Chronic idiopathic urticaria: overall stable to slightly improved with inconsistent fexofenadine usage. We discussed dose uptitration and increased consistency of use to 360 mg BID. We also discussed omalizumab if refractory.  - increased fexofenadine to 360 mg BID consistently    Procedures Performed:    None    Follow-up: 3 months    Staff:     Donald Richards MD, FAAD   of Dermatology  Department of Dermatology  [facility]    ____________________________________________    CC: Video Visit (Chronic urticaria )    HPI:  Mr. Tony Blue is a(n) 27 year old male who presents today as a return patient for chronic urticaria    Chronic urticaria - no significant changes  - remains active  - taking fexofenadine 180 mg BID - works to improve pruritus, but rashes still appearing  - taking when plan on going out in warm weather or exercising  - temporarily improved when had COVID-19, then worsened when convalescing    Patient is otherwise feeling well, without additional skin concerns.    Labs Reviewed:  9/7/22 CBC, CMP TSH unremarkable    Physical Exam:  Vitals: There were no vitals taken for this visit.  SKIN: video images  were reviewed; image quality and interpretability: acceptable. Image date: n/a.  - no rash appreciated on video  - No other lesions of concern on areas examined.     Medications:  Current Outpatient Medications   Medication     montelukast (SINGULAIR) 10 MG tablet     No current facility-administered medications for this visit.      Past Medical/Surgical History:   Patient Active Problem List   Diagnosis     Prediabetes     Hypertriglyceridemia     Morbid obesity (H)     No past medical history on file.    CC Gonzalez Bentley MD  [address] [facility] [place] [address] on close of this encounter.

## 2022-11-14 NOTE — NURSING NOTE
Chief Complaint   Patient presents with     Video Visit     Chronic urticaria    Teledermatology Nurse Call Patients:     Are you in the [facility] at the time of the encounter? yes    Today's visit will be billed to you and your insurance.    A teledermatology visit is not as thorough as an in-person visit and the quality of the photograph sent may not be of the same quality as that taken by the dermatology clinic.      Pt stated nothing has changed since his last photo upload, so he didn't upload photos at this time. If you feel he needs to send some in he is willing.  Gosia Bloom 11/14/22

## 2022-11-14 NOTE — PROGRESS NOTES
[facility] Dermatology Note  Encounter Date: Nov 14, 2022  Video (invitation sent by Gosia). Location of teledermatologist: [facility] DERMATOLOGY CLINIC [place]. Start time: 12:08. End time: 12:24.    Dermatology Problem List:  1. Chronic urticaria:   - current: fexofenadine 360 mg BID  - prior: cetirizine 10-20 mg daily, montelukast  - labs unremarkable    ____________________________________________    Assessment & Plan:     1. Chronic idiopathic urticaria: overall stable to slightly improved with inconsistent fexofenadine usage. We discussed dose uptitration and increased consistency of use to 360 mg BID. We also discussed omalizumab if refractory.  - increased fexofenadine to 360 mg BID consistently    Procedures Performed:    None    Follow-up: 3 months    Staff:     Donald Richards MD, FAAD   of Dermatology  Department of Dermatology  [facility]    ____________________________________________    CC: Video Visit (Chronic urticaria )    HPI:  Mr. Tony Blue is a(n) 27 year old male who presents today as a return patient for chronic urticaria    Chronic urticaria - no significant changes  - remains active  - taking fexofenadine 180 mg BID - works to improve pruritus, but rashes still appearing  - taking when plan on going out in warm weather or exercising  - temporarily improved when had COVID-19, then worsened when convalescing    Patient is otherwise feeling well, without additional skin concerns.    Labs Reviewed:  9/7/22 CBC, CMP TSH unremarkable    Physical Exam:  Vitals: There were no vitals taken for this visit.  SKIN: video images were reviewed; image quality and interpretability: acceptable. Image date: n/a.  - no rash appreciated on video  - No other lesions of concern on areas examined.     Medications:  Current Outpatient Medications   Medication     montelukast (SINGULAIR) 10 MG tablet     No current  facility-administered medications for this visit.      Past Medical/Surgical History:   Patient Active Problem List   Diagnosis     Prediabetes     Hypertriglyceridemia     Morbid obesity (H)     No past medical history on file.    CC Gonzalez Bentley MD  [address] [place] [address] on close of this encounter.

## 2022-11-20 ENCOUNTER — HEALTH MAINTENANCE LETTER (OUTPATIENT)
Age: 27
End: 2022-11-20

## 2023-04-15 ENCOUNTER — HEALTH MAINTENANCE LETTER (OUTPATIENT)
Age: 28
End: 2023-04-15

## 2024-02-07 ENCOUNTER — OFFICE VISIT (OUTPATIENT)
Dept: PEDIATRICS | Facility: CLINIC | Age: 29
End: 2024-02-07
Payer: COMMERCIAL

## 2024-02-07 ENCOUNTER — ANCILLARY PROCEDURE (OUTPATIENT)
Dept: GENERAL RADIOLOGY | Facility: CLINIC | Age: 29
End: 2024-02-07
Attending: PHYSICIAN ASSISTANT
Payer: COMMERCIAL

## 2024-02-07 VITALS
RESPIRATION RATE: 20 BRPM | DIASTOLIC BLOOD PRESSURE: 68 MMHG | HEART RATE: 83 BPM | TEMPERATURE: 97.8 F | WEIGHT: 242 LBS | SYSTOLIC BLOOD PRESSURE: 104 MMHG | BODY MASS INDEX: 36.8 KG/M2 | OXYGEN SATURATION: 98 %

## 2024-02-07 DIAGNOSIS — R05.2 SUBACUTE COUGH: Primary | ICD-10-CM

## 2024-02-07 DIAGNOSIS — R05.2 SUBACUTE COUGH: ICD-10-CM

## 2024-02-07 PROCEDURE — 99213 OFFICE O/P EST LOW 20 MIN: CPT | Performed by: PHYSICIAN ASSISTANT

## 2024-02-07 PROCEDURE — 71046 X-RAY EXAM CHEST 2 VIEWS: CPT | Mod: TC | Performed by: RADIOLOGY

## 2024-02-07 RX ORDER — AZITHROMYCIN 250 MG/1
TABLET, FILM COATED ORAL
Qty: 6 TABLET | Refills: 0 | Status: SHIPPED | OUTPATIENT
Start: 2024-02-07 | End: 2024-02-12

## 2024-02-07 RX ORDER — BENZONATATE 200 MG/1
200 CAPSULE ORAL 3 TIMES DAILY PRN
Qty: 30 CAPSULE | Refills: 0 | Status: SHIPPED | OUTPATIENT
Start: 2024-02-07 | End: 2024-02-17

## 2024-02-07 ASSESSMENT — LIFESTYLE VARIABLES: SMOKING_STATUS: 0

## 2024-02-07 ASSESSMENT — PAIN SCALES - GENERAL: PAINLEVEL: NO PAIN (0)

## 2024-02-07 ASSESSMENT — ENCOUNTER SYMPTOMS
COUGH: 1
WHEEZING: 1

## 2024-02-07 NOTE — PATIENT INSTRUCTIONS
Start Azithromycin antibiotic for cough/bronchitis.   Can take benzonatate as needed for cough.   Continue with symptomatic cares.

## 2024-02-07 NOTE — PROGRESS NOTES
Assessment & Plan     Subacute cough  Chest x-ray negative today. Lungs are clear.   Recommend due to worsening symptoms in the last week to start Azithromycin for possible bronchitis.   Patient also given benzonatate to help with cough.   If fever occurs or worsening cough recommend coming back into clinic.  Consider adding in antihistamine. Consider trial of antacid.  - XR Chest 2 Views; Future  - benzonatate (TESSALON) 200 MG capsule; Take 1 capsule (200 mg) by mouth 3 times daily as needed for cough  - azithromycin (ZITHROMAX) 250 MG tablet; Take 2 tablets (500 mg) by mouth daily for 1 day, THEN 1 tablet (250 mg) daily for 4 days.        Brie Jimenez is a 28 year old, presenting for the following health issues:  Cough      2/7/2024     2:18 PM   Additional Questions   Roomed by Kaleigh   Accompanied by Wife         2/7/2024     2:18 PM   Patient Reported Additional Medications   Patient reports taking the following new medications None     Cough  This is a recurrent problem. The current episode started more than 1 week ago. The cough is Productive of brown sputum. There has been no fever. Associated symptoms include wheezing. He has tried decongestants and cough syrup for the symptoms. The treatment provided mild relief. He is not a smoker.   History of Present Illness       Reason for visit:  Long lasting cough  Symptom onset:  More than a month  Symptoms include:  Cough, runny nose  Symptom intensity:  Moderate  Symptom progression:  Staying the same  Had these symptoms before:  Yes  Has tried/received treatment for these symptoms:  Yes  Previous treatment was successful:  No  What makes it better:  Cough drops    He eats 2-3 servings of fruits and vegetables daily.He consumes 0 sweetened beverage(s) daily.He exercises with enough effort to increase his heart rate 10 to 19 minutes per day.  He exercises with enough effort to increase his heart rate 3 or less days per week.   He is taking medications  regularly.    Patient is a 28 y.o. male who presents to the clinic for cough x 3 months. Patient reports he gets sick, most of his symptoms clear, but his cough persists. Patient reports he had URI symptoms last week and today he still has a cough. Patient reports he notices raspiness with fully exhaling. Patient has tried decongestants and cough syrup without significant improvement. He is having a difficult time sleeping. Patient does have two small kids at home as well that have been sick. He denies fevers. He reports coughing up brown phlegm in the morning and is clear/yellow in the afternoon. Denies hx of asthma or use of inhalers. Has been on allergy medication in the past due to seasonal allergies. He reports this is different. Pt is a non-smoker. Pt denies acid reflux or stomach irritation. He tested negative for COVID last week.           Review of Systems  Constitutional, HEENT, cardiovascular, pulmonary, gi and gu systems are negative, except as otherwise noted.      Objective    /68   Pulse 83   Temp 97.8  F (36.6  C)   Resp 20   Wt 109.8 kg (242 lb)   SpO2 98%   BMI 36.80 kg/m    Body mass index is 36.8 kg/m .    Physical Exam   GENERAL: alert and no distress  EYES: Eyes grossly normal to inspection, PERRL and conjunctivae and sclerae normal  HENT: ear canals and TM's normal, nose and mouth without ulcers or lesions  NECK: no adenopathy, no asymmetry, masses, or scars  RESP: lungs clear to auscultation - no rales, rhonchi or wheezes  CV: regular rate and rhythm, normal S1 S2, no S3 or S4, no murmur, click or rub, no peripheral edema  MS: no gross musculoskeletal defects noted, no edema  SKIN: no suspicious lesions or rashes  PSYCH: mentation appears normal, affect normal/bright    XR Chest 2 Views    Result Date: 2/7/2024  CHEST TWO VIEWS 2/7/2024 2:47 PM HISTORY: Subacute cough COMPARISON: None.     IMPRESSION: There are no acute infiltrates. The cardiac silhouette is not enlarged.  Pulmonary vasculature is unremarkable. MAURA GONZALEZ MD   SYSTEM ID:  [id number]       Signed Electronically by: Kirsty Ridley PA-C

## 2024-06-16 ENCOUNTER — HEALTH MAINTENANCE LETTER (OUTPATIENT)
Age: 29
End: 2024-06-16

## 2025-02-12 ENCOUNTER — OFFICE VISIT (OUTPATIENT)
Dept: URGENT CARE | Facility: URGENT CARE | Age: 30
End: 2025-02-12
Payer: COMMERCIAL

## 2025-02-12 VITALS
BODY MASS INDEX: 36.8 KG/M2 | WEIGHT: 242 LBS | SYSTOLIC BLOOD PRESSURE: 108 MMHG | DIASTOLIC BLOOD PRESSURE: 80 MMHG | HEART RATE: 123 BPM | OXYGEN SATURATION: 98 % | TEMPERATURE: 103.1 F | RESPIRATION RATE: 20 BRPM

## 2025-02-12 DIAGNOSIS — R50.9 FEBRILE ILLNESS: ICD-10-CM

## 2025-02-12 DIAGNOSIS — J10.1 INFLUENZA A: Primary | ICD-10-CM

## 2025-02-12 LAB
FLUAV AG SPEC QL IA: POSITIVE
FLUBV AG SPEC QL IA: NEGATIVE

## 2025-02-12 PROCEDURE — 99213 OFFICE O/P EST LOW 20 MIN: CPT | Performed by: FAMILY MEDICINE

## 2025-02-12 PROCEDURE — 87804 INFLUENZA ASSAY W/OPTIC: CPT

## 2025-02-12 RX ORDER — OSELTAMIVIR PHOSPHATE 75 MG/1
75 CAPSULE ORAL 2 TIMES DAILY
Qty: 10 CAPSULE | Refills: 0 | Status: SHIPPED | OUTPATIENT
Start: 2025-02-12 | End: 2025-02-17

## 2025-02-12 NOTE — PATIENT INSTRUCTIONS
Tamiflu 1 pill twice a day for the next 5 days.    Drink lots of fluids and rest as much as you can.

## 2025-02-12 NOTE — PROGRESS NOTES
ICD-10-CM    1. Influenza A  J10.1 oseltamivir (TAMIFLU) 75 MG capsule      2. Febrile illness  R50.9 Influenza A & B Antigen - Clinic Collect        Within first 48 hours of influenza onset.  Pt interested in trying Tamiflu to reduce symptoms and shorten duration of illness.  Discussed possible GI side effects.  Also discussed the option of contacting PCPs of household members to discuss Tamiflu prophylaxis given close contact with flu A.    PLAN:  Patient Instructions   Tamiflu 1 pill twice a day for the next 5 days.    Drink lots of fluids and rest as much as you can.    Encouraged continued Tylenol/ibuprofen to help with achiness and fevers.    SUBJECTIVE:  Tony Blue is a 29 year old male who presents to  today with sudden onset of body aches and fever yesterday.  +cough.  Mild sore throat, no ear pain, no GI upset, no rashes.  Was also sick with viral URI symptoms, but less intense, starting 2/2 and resolved a few days before getting sick again yesterday.    OBJECTIVE:  /80   Pulse (!) 123   Temp (!) 103.1  F (39.5  C)   Resp 20   Wt 109.8 kg (242 lb)   SpO2 98%   BMI 36.80 kg/m    GEN: mildly ill-appearing, in NAD  ENT: TMs normal, oral MMM, pharynx minimally erythematous, no exudates, uvula midline  Neck: no LAD noted  Lungs:  CTAB, good air entry throughout  CV:  slightly tachycardic, reg rhythm, no murmurs    Results for orders placed or performed in visit on 02/12/25   Influenza A & B Antigen - Clinic Collect     Status: Abnormal    Specimen: Nose; Swab   Result Value Ref Range    Influenza A antigen Positive (A) Negative    Influenza B antigen Negative Negative    Narrative    Test results must be correlated with clinical data. If necessary, results should be confirmed by a molecular assay or viral culture.

## 2025-06-21 ENCOUNTER — HEALTH MAINTENANCE LETTER (OUTPATIENT)
Age: 30
End: 2025-06-21